# Patient Record
Sex: MALE | Race: WHITE | URBAN - METROPOLITAN AREA
[De-identification: names, ages, dates, MRNs, and addresses within clinical notes are randomized per-mention and may not be internally consistent; named-entity substitution may affect disease eponyms.]

---

## 2020-09-03 ENCOUNTER — OFFICE VISIT (OUTPATIENT)
Dept: FAMILY MEDICINE CLINIC | Facility: CLINIC | Age: 45
End: 2020-09-03
Payer: COMMERCIAL

## 2020-09-03 ENCOUNTER — TELEPHONE (OUTPATIENT)
Dept: FAMILY MEDICINE CLINIC | Facility: CLINIC | Age: 45
End: 2020-09-03

## 2020-09-03 VITALS
DIASTOLIC BLOOD PRESSURE: 102 MMHG | RESPIRATION RATE: 18 BRPM | SYSTOLIC BLOOD PRESSURE: 152 MMHG | OXYGEN SATURATION: 99 % | WEIGHT: 183 LBS | HEIGHT: 68 IN | TEMPERATURE: 97 F | HEART RATE: 68 BPM | BODY MASS INDEX: 27.74 KG/M2

## 2020-09-03 DIAGNOSIS — M25.572 CHRONIC PAIN OF LEFT ANKLE: ICD-10-CM

## 2020-09-03 DIAGNOSIS — Z76.89 ENCOUNTER TO ESTABLISH CARE: ICD-10-CM

## 2020-09-03 DIAGNOSIS — R03.0 ELEVATED BLOOD PRESSURE READING: ICD-10-CM

## 2020-09-03 DIAGNOSIS — G89.29 CHRONIC PAIN OF LEFT ANKLE: ICD-10-CM

## 2020-09-03 DIAGNOSIS — Z00.00 ANNUAL PHYSICAL EXAM: Primary | ICD-10-CM

## 2020-09-03 LAB
ALBUMIN SERPL-MCNC: 3.9 G/DL (ref 3.4–5)
ALBUMIN/GLOB SERPL: 1 {RATIO} (ref 1–2)
ALP LIVER SERPL-CCNC: 67 U/L (ref 45–117)
ALT SERPL-CCNC: 62 U/L (ref 16–61)
ANION GAP SERPL CALC-SCNC: 6 MMOL/L (ref 0–18)
AST SERPL-CCNC: 22 U/L (ref 15–37)
BASOPHILS # BLD AUTO: 0.1 X10(3) UL (ref 0–0.2)
BASOPHILS NFR BLD AUTO: 1.4 %
BILIRUB SERPL-MCNC: 0.8 MG/DL (ref 0.1–2)
BUN BLD-MCNC: 13 MG/DL (ref 7–18)
BUN/CREAT SERPL: 14 (ref 10–20)
CALCIUM BLD-MCNC: 8.7 MG/DL (ref 8.5–10.1)
CHLORIDE SERPL-SCNC: 104 MMOL/L (ref 98–112)
CHOLEST SMN-MCNC: 264 MG/DL (ref ?–200)
CO2 SERPL-SCNC: 26 MMOL/L (ref 21–32)
CREAT BLD-MCNC: 0.93 MG/DL (ref 0.7–1.3)
DEPRECATED RDW RBC AUTO: 41.6 FL (ref 35.1–46.3)
EOSINOPHIL # BLD AUTO: 0.08 X10(3) UL (ref 0–0.7)
EOSINOPHIL NFR BLD AUTO: 1.1 %
ERYTHROCYTE [DISTWIDTH] IN BLOOD BY AUTOMATED COUNT: 12.7 % (ref 11–15)
EST. AVERAGE GLUCOSE BLD GHB EST-MCNC: 108 MG/DL (ref 68–126)
GLOBULIN PLAS-MCNC: 3.8 G/DL (ref 2.8–4.4)
GLUCOSE BLD-MCNC: 84 MG/DL (ref 70–99)
HBA1C MFR BLD HPLC: 5.4 % (ref ?–5.7)
HCT VFR BLD AUTO: 49.5 % (ref 39–53)
HDLC SERPL-MCNC: 57 MG/DL (ref 40–59)
HGB BLD-MCNC: 16.8 G/DL (ref 13–17.5)
IMM GRANULOCYTES # BLD AUTO: 0.03 X10(3) UL (ref 0–1)
IMM GRANULOCYTES NFR BLD: 0.4 %
LDLC SERPL CALC-MCNC: 196 MG/DL (ref ?–100)
LYMPHOCYTES # BLD AUTO: 1.91 X10(3) UL (ref 1–4)
LYMPHOCYTES NFR BLD AUTO: 26.1 %
M PROTEIN MFR SERPL ELPH: 7.7 G/DL (ref 6.4–8.2)
MCH RBC QN AUTO: 30.3 PG (ref 26–34)
MCHC RBC AUTO-ENTMCNC: 33.9 G/DL (ref 31–37)
MCV RBC AUTO: 89.4 FL (ref 80–100)
MONOCYTES # BLD AUTO: 0.69 X10(3) UL (ref 0.1–1)
MONOCYTES NFR BLD AUTO: 9.4 %
NEUTROPHILS # BLD AUTO: 4.51 X10 (3) UL (ref 1.5–7.7)
NEUTROPHILS # BLD AUTO: 4.51 X10(3) UL (ref 1.5–7.7)
NEUTROPHILS NFR BLD AUTO: 61.6 %
NONHDLC SERPL-MCNC: 207 MG/DL (ref ?–130)
OSMOLALITY SERPL CALC.SUM OF ELEC: 281 MOSM/KG (ref 275–295)
PATIENT FASTING Y/N/NP: YES
PATIENT FASTING Y/N/NP: YES
PLATELET # BLD AUTO: 277 10(3)UL (ref 150–450)
POTASSIUM SERPL-SCNC: 3.7 MMOL/L (ref 3.5–5.1)
RBC # BLD AUTO: 5.54 X10(6)UL (ref 4.3–5.7)
SODIUM SERPL-SCNC: 136 MMOL/L (ref 136–145)
TRIGL SERPL-MCNC: 54 MG/DL (ref 30–149)
TSI SER-ACNC: 0.96 MIU/ML (ref 0.36–3.74)
VLDLC SERPL CALC-MCNC: 11 MG/DL (ref 0–30)
WBC # BLD AUTO: 7.3 X10(3) UL (ref 4–11)

## 2020-09-03 PROCEDURE — 85025 COMPLETE CBC W/AUTO DIFF WBC: CPT | Performed by: FAMILY MEDICINE

## 2020-09-03 PROCEDURE — 3077F SYST BP >= 140 MM HG: CPT | Performed by: FAMILY MEDICINE

## 2020-09-03 PROCEDURE — 3080F DIAST BP >= 90 MM HG: CPT | Performed by: FAMILY MEDICINE

## 2020-09-03 PROCEDURE — 99202 OFFICE O/P NEW SF 15 MIN: CPT | Performed by: FAMILY MEDICINE

## 2020-09-03 PROCEDURE — 80053 COMPREHEN METABOLIC PANEL: CPT | Performed by: FAMILY MEDICINE

## 2020-09-03 PROCEDURE — 3008F BODY MASS INDEX DOCD: CPT | Performed by: FAMILY MEDICINE

## 2020-09-03 PROCEDURE — 84443 ASSAY THYROID STIM HORMONE: CPT | Performed by: FAMILY MEDICINE

## 2020-09-03 PROCEDURE — 80061 LIPID PANEL: CPT | Performed by: FAMILY MEDICINE

## 2020-09-03 PROCEDURE — 99386 PREV VISIT NEW AGE 40-64: CPT | Performed by: FAMILY MEDICINE

## 2020-09-03 PROCEDURE — 83036 HEMOGLOBIN GLYCOSYLATED A1C: CPT | Performed by: FAMILY MEDICINE

## 2020-09-03 NOTE — PROGRESS NOTES
Shayy Luna is a 54year old male who presents for a complete physical exam.   HPI:   No concerns today.   Patient reports injury to his ankle and is seeing orthopedic surgeon and had surgery done on left ankle in past. He still has pain and wants to see ot anxiety      EXAM:   Resp 18   Ht 68\"   Wt 183 lb (83 kg)   BMI 27.83 kg/m²   Body mass index is 27.83 kg/m².    GENERAL: well nourished,in no apparent distress  SKIN: no rashes  HEENT: ears and throat are clear  EYES:PERRLA, EOMI, normal optic disk,conjun

## 2020-09-03 NOTE — PATIENT INSTRUCTIONS
Exercise for a Healthier Heart     Exercise with a friend. When activity is fun, you're more likely to stick with it. You may wonder how you can improve the health of your heart. If you’re thinking about exercise, you’re on the right track.  You don’t n Choose one or more activities you enjoy. Walking is one of the easiest things you can do. You can also try swimming, riding a bike, dancing, or taking an exercise class.   Stop exercising and call your doctor if you:  · Have chest pain or feel dizzy or ligh · Pause before you add sugars to pancakes, cereal, coffee, or tea. This includes white and brown table sugar, syrup, honey, and molasses. Cut your usual amount by half. · Use non-sugar sweeteners.  Stevia, aspartame, and sucralose can satisfy a sweet tooth · Flour tortillas  · Packaged muffins, pancakes, and biscuits  · Instant hot cereals  · Cottage cheese    For lunch and dinner  · Fresh fish, chicken, turkey, or meat—baked, broiled, or roasted without salt  · Dry beans, cooked without salt  · Marissa, stir-f

## 2020-09-03 NOTE — TELEPHONE ENCOUNTER
----- Message from Melissa Salazar MD sent at 9/3/2020  4:50 PM CDT -----  Please call patient and inform his labs shows normal liver and kidney functions, normal cbc and thyroid. His cholesterol, ldl are elevated.  I would recommend he start low fat diet a

## 2020-09-16 ENCOUNTER — TELEPHONE (OUTPATIENT)
Dept: FAMILY MEDICINE CLINIC | Facility: CLINIC | Age: 45
End: 2020-09-16

## 2020-09-16 DIAGNOSIS — G89.29 CHRONIC PAIN OF LEFT ANKLE: Primary | ICD-10-CM

## 2020-09-16 DIAGNOSIS — M25.572 CHRONIC PAIN OF LEFT ANKLE: Primary | ICD-10-CM

## 2020-09-16 NOTE — TELEPHONE ENCOUNTER
PATIENT WAS SEEN ON SEPT AND WAS TOLD TO SEE AN ORTHO HE DOES NOT REMEMBER THE NAME HE DOES NOT WANT TO GO TO [place] [id number]

## 2020-09-16 NOTE — TELEPHONE ENCOUNTER
Referral placed for Dr Anil Dunham. States that he remembered the name of the original ortho doctor with [facility]. No further questions.

## 2020-09-16 NOTE — TELEPHONE ENCOUNTER
No name for ortho in his chart-Dr Valentino Chen goes to the [facility] office. Is this who you wanted him to see?

## 2020-09-29 ENCOUNTER — TELEPHONE (OUTPATIENT)
Dept: FAMILY MEDICINE CLINIC | Facility: CLINIC | Age: 45
End: 2020-09-29

## 2020-09-29 NOTE — TELEPHONE ENCOUNTER
Pt was in contact with a friend over the weekend that tested positive for covid, his friend has a fever, no appetite, tested positive on Monday morning. Pt states he does not have any sx, but wants to know what he should do.   Please call

## 2020-09-29 NOTE — TELEPHONE ENCOUNTER
He should quarantine for 10 days. Avoid ibuprofen. If he has symptoms call office and make appointment. If he wish to get tested then schedule him doxi.

## 2020-09-29 NOTE — TELEPHONE ENCOUNTER
Spoke to patient. Patient advised. Verbalized understanding. Scheduled video visit.    Future Appointments   Date Time Provider Arsh Mary   9/29/2020  1:20 PM Blayne Lombardo MD [facility] Bermudez verbally consents to a Virtual/Tel

## 2020-10-29 ENCOUNTER — TELEPHONE (OUTPATIENT)
Dept: FAMILY MEDICINE CLINIC | Facility: CLINIC | Age: 45
End: 2020-10-29

## 2020-10-29 NOTE — TELEPHONE ENCOUNTER
Pt is having trouble breathing due to the change in season, happens every year. He was wanting to see if he can get some kind of medication to help with this issue.    Please advise

## 2024-10-22 ENCOUNTER — PATIENT OUTREACH (OUTPATIENT)
Dept: FAMILY MEDICINE CLINIC | Facility: CLINIC | Age: 49
End: 2024-10-22

## 2025-01-20 ENCOUNTER — HOSPITAL ENCOUNTER (OUTPATIENT)
Age: 50
Discharge: HOME | End: 2025-01-20
Payer: MEDICAID

## 2025-01-20 ENCOUNTER — APPOINTMENT (OUTPATIENT)
Dept: GENERAL RADIOLOGY | Age: 50
End: 2025-01-20
Attending: NURSE PRACTITIONER
Payer: MEDICAID

## 2025-01-20 VITALS
HEIGHT: 68 IN | BODY MASS INDEX: 28.79 KG/M2 | DIASTOLIC BLOOD PRESSURE: 100 MMHG | TEMPERATURE: 99 F | OXYGEN SATURATION: 97 % | RESPIRATION RATE: 18 BRPM | WEIGHT: 190 LBS | SYSTOLIC BLOOD PRESSURE: 160 MMHG | HEART RATE: 95 BPM

## 2025-01-20 DIAGNOSIS — R05.2 SUBACUTE COUGH: Primary | ICD-10-CM

## 2025-01-20 DIAGNOSIS — R03.0 ELEVATED BLOOD PRESSURE READING: ICD-10-CM

## 2025-01-20 PROCEDURE — 99203 OFFICE O/P NEW LOW 30 MIN: CPT | Performed by: NURSE PRACTITIONER

## 2025-01-20 PROCEDURE — 71046 X-RAY EXAM CHEST 2 VIEWS: CPT | Performed by: NURSE PRACTITIONER

## 2025-01-20 RX ORDER — DEXAMETHASONE 4 MG/1
4 TABLET ORAL ONCE
Status: COMPLETED | OUTPATIENT
Start: 2025-01-20 | End: 2025-01-20

## 2025-01-20 RX ORDER — ALBUTEROL SULFATE 90 UG/1
2 INHALANT RESPIRATORY (INHALATION) EVERY 4 HOURS PRN
Qty: 1 EACH | Refills: 0 | Status: SHIPPED | OUTPATIENT
Start: 2025-01-20 | End: 2025-02-19

## 2025-01-20 NOTE — ED INITIAL ASSESSMENT (HPI)
Pt states he's had a cough on and off since thanksgiving, states he bought a 'breathing exercise machine' off [employer] and states worsening cough the last 3 days after using it. No fevers.

## 2025-01-20 NOTE — DISCHARGE INSTRUCTIONS
- Use inhaler (1-2 puffs every 4-6 hours) with spacer as needed for chest tightness, wheezing, and/or bronchospasm    Supportive care measures to try as applicable:  General:   - Wash hands often   - Drink plenty of fluids (water, Pedialyte, etc.)   - Get plenty of rest and sleep with head elevated to help with sinus drainage and throat irritation   - Do not share utensils or drinks   - Alternate Ibuprofen (adult: 600mg) and Tylenol (adult: 650-1000mg) as needed for pain / body aches / fever   - Take a multivitamin and extra Vitamin D (~2000IU) daily, year round   - Prophylactic Vitamin C can help reduce symptoms if you become infected, but is less effective when already ill   - You may benefit from Zinc (~20mg) every day, or every other other day, for a week while sick (Zinc has been shown to kill respiratory viruses)    Cough / Sinus:   - Using saline spray or a couple drops into nostrils a couple times a day can help with sinus inflammation (Use nasal spray with nose forward and applicator tip pointed towards outside of eye. Breath normally. You should not feel medication go down your throat.)   - Avoid having air blow on your face as this can worsen congestion / cough   - You may benefit from placing a garlic clove in each nostril for 10-15min which should irritate sinuses, causing you to get rid of stuck mucus. Blow your nose thoroughly afterwards   - You may benefit from spoonfuls of honey (and/or added to warm drinks) throughout the day for cough   - You may benefit from taking a decongestant (e.g. Sudafed - pseudoephedrine [behind the pharmacy counter]) (may temporarily elevate your heart rate and blood pressure)   - You may benefit from cough medication containing \"Dextromethorphan\" (e.g. Delsym)   - You may benefit from using a humidifier and/or steam showers   - You may benefit from Flonase nasal spray daily (Use with head tilted down and tip pointed towards outside of eye. Breath normally. You should  not feel medication go down your throat)   - You may benefit from taking a daily allergy medication (e.g. Zyrtec, Xyzal, etc.)   - You may benefit from boiling water with lemon and cayenne pepper, then breathing in the steam (you can cover your head with a towel to help funnel the steam)

## 2025-01-20 NOTE — ED PROVIDER NOTES
History     Chief Complaint   Patient presents with    Cough/URI       Subjective:   HPI    Mervin Bermudez, 49 year old male with notable medical history of n/a who presents with cough. Patient reports intermittent cough for the past couple months. Denies associated fever, chills, WARD, SOB, URI symptoms. Patient recently purchased a breath , but thinks it made his symptoms worse. Patient also currently working in a basement and cutting wood.    Patient reports Hx similar in the remote past and being treated for \"walking pneumonia\" with antibiotics, steroids, and an inhaler.      There is no problem list on file for this patient.     Objective:   No pertinent past medical history.            No pertinent past surgical history.              No pertinent social history.            Medications Ordered Prior to Encounter[1]      Constitutional and vital signs reviewed.      All other systems reviewed and negative except as noted above.    I have reviewed the family history, social history, allergies, and outpatient medications.     History reviewed from EMR: Encounters, problem list, allergies, medications      Physical Exam     ED Triage Vitals [01/20/25 1615]   BP (!) 160/100   Pulse 95   Resp 18   Temp 98.7 °F (37.1 °C)   Temp src Oral   SpO2 97 %   O2 Device None (Room air)       Current:BP (!) 160/100   Pulse 95   Temp 98.7 °F (37.1 °C) (Oral)   Resp 18   Ht 172.7 cm (5' 8\")   Wt 86.2 kg   SpO2 97%   BMI 28.89 kg/m²       Physical Exam  Vitals and nursing note reviewed.   Constitutional:       General: He is not in acute distress.     Appearance: Normal appearance. He is normal weight. He is not ill-appearing or toxic-appearing.   HENT:      Head: Normocephalic and atraumatic.      Right Ear: External ear normal.      Left Ear: External ear normal.      Nose: Nose normal. No congestion or rhinorrhea.      Mouth/Throat:      Mouth: Mucous membranes are moist.   Eyes:      Extraocular Movements:  Extraocular movements intact.      Conjunctiva/sclera: Conjunctivae normal.      Pupils: Pupils are equal, round, and reactive to light.   Cardiovascular:      Rate and Rhythm: Normal rate and regular rhythm.      Pulses: Normal pulses.      Heart sounds: Normal heart sounds.   Pulmonary:      Effort: Pulmonary effort is normal. No respiratory distress.      Breath sounds: Normal breath sounds and air entry. No decreased breath sounds, wheezing, rhonchi or rales.      Comments: LS clear throughout  Musculoskeletal:         General: No swelling, tenderness or signs of injury. Normal range of motion.      Cervical back: Normal range of motion.   Skin:     General: Skin is warm and dry.      Capillary Refill: Capillary refill takes less than 2 seconds.   Neurological:      General: No focal deficit present.      Mental Status: He is alert and oriented to person, place, and time. Mental status is at baseline.   Psychiatric:         Mood and Affect: Mood normal.         Behavior: Behavior normal.         Thought Content: Thought content normal.         Judgment: Judgment normal.            ED Course     Labs Reviewed - No data to display  XR CHEST PA + LAT CHEST (CPT=71046)   Final Result   PROCEDURE:  XR CHEST PA + LAT CHEST (CPT=71046)       INDICATIONS:  intermittent cough for a couple months       COMPARISON:  None.       TECHNIQUE:  PA and lateral chest radiographs were obtained.       PATIENT STATED HISTORY: (As transcribed by Technologist)  Patient    complains of intermittent, dry cough over the last 3 months.             FINDINGS:  Cardiac silhouette and pulmonary vasculature within normal    limits. No focal consolidation, pneumothorax or pleural effusion.                         =====   CONCLUSION:  No focal consolidation.           LOCATION:  [facility]           Dictated by (CST): Barbara Childress MD on 1/20/2025 at 4:42 PM        Finalized by (CST): Barbara Childress MD on 1/20/2025 at 4:43 PM             Vitals:     01/20/25 1615   BP: (!) 160/100   Pulse: 95   Resp: 18   Temp: 98.7 °F (37.1 °C)   TempSrc: Oral   SpO2: 97%   Weight: 86.2 kg   Height: 172.7 cm (5' 8\")            AILYN Bermudez, 49 year old male with medical history as noted above who presents with cough   - Patient in NAD, VSS   - post-viral cough vs allergies vs viral vs PNA vs structural deformity vs mass vs bronchospasm vs other   - Less likely PNA given HPI, duration of symptoms, and exam   - CXR, Decadron, Saline neb, monitor        ** See ED course below for additional information on care provided / interventions / notable events throughout patient's encounter.    ** See Home Care Instructions below for care measures to trial as applicable.    ED Course as of 01/20/25 1647  ------------------------------------------------------------  Time: 01/20 1640  Comment: Self read of imaging w/o obvious acute process. Awaiting official read.    ------------------------------------------------------------  Time: 01/20 1646  Comment: Radiology confirming no acute process  Reassurance provided  Advised to f/u with primary care provider        ** I have independently reviewed the radiology images, clinical lab results, and ECG tracings as described above (if applicable)    ** Concerning co-morbidities possibly affecting complaint / care: n/a    ** See disposition & plan section below for home care instructions - if applicable        Medical Decision Making  Amount and/or Complexity of Data Reviewed  Radiology: ordered and independent interpretation performed. Decision-making details documented in ED Course.    Risk  OTC drugs.  Prescription drug management.        Disposition and Plan     Disposition:  Discharge  1/20/2025  4:47 pm    Clinical Impression:  1. Subacute cough    2. Elevated blood pressure reading            Home care instructions:     - Use inhaler (1-2 puffs every 4-6 hours) with spacer as needed for chest tightness, wheezing, and/or  bronchospasm    Supportive care measures to try as applicable:  General:   - Wash hands often   - Drink plenty of fluids (water, Pedialyte, etc.)   - Get plenty of rest and sleep with head elevated to help with sinus drainage and throat irritation   - Do not share utensils or drinks   - Alternate Ibuprofen (adult: 600mg) and Tylenol (adult: 650-1000mg) as needed for pain / body aches / fever   - Take a multivitamin and extra Vitamin D (~2000IU) daily, year round   - Prophylactic Vitamin C can help reduce symptoms if you become infected, but is less effective when already ill   - You may benefit from Zinc (~20mg) every day, or every other other day, for a week while sick (Zinc has been shown to kill respiratory viruses)    Cough / Sinus:   - Using saline spray or a couple drops into nostrils a couple times a day can help with sinus inflammation (Use nasal spray with nose forward and applicator tip pointed towards outside of eye. Breath normally. You should not feel medication go down your throat.)   - Avoid having air blow on your face as this can worsen congestion / cough   - You may benefit from placing a garlic clove in each nostril for 10-15min which should irritate sinuses, causing you to get rid of stuck mucus. Blow your nose thoroughly afterwards   - You may benefit from spoonfuls of honey (and/or added to warm drinks) throughout the day for cough   - You may benefit from taking a decongestant (e.g. Sudafed - pseudoephedrine [behind the pharmacy counter]) (may temporarily elevate your heart rate and blood pressure)   - You may benefit from cough medication containing \"Dextromethorphan\" (e.g. Delsym)   - You may benefit from using a humidifier and/or steam showers   - You may benefit from Flonase nasal spray daily (Use with head tilted down and tip pointed towards outside of eye. Breath normally. You should not feel medication go down your throat)   - You may benefit from taking a daily allergy medication (e.g.  Zyrtec, Xyzal, etc.)   - You may benefit from boiling water with lemon and cayenne pepper, then breathing in the steam (you can cover your head with a towel to help funnel the steam)      Follow-up:  [facility], [address], [place]  [address]  [facility] [address] [phone]    Primary Care contact to establish care (if needed)          Medications Prescribed:  Current Discharge Medication List        START taking these medications    Details   albuterol 108 (90 Base) MCG/ACT Inhalation Aero Soln Inhale 2 puffs into the lungs every 4 (four) hours as needed for Wheezing.  Qty: 1 each, Refills: 0               Ever Tenorio, JARED, APRN, AGACNP-BC, FNP-C, CNL  Adult-Gerontology Acute Care & Family Nurse Practitioner  [facility]      The above patient (and/or guardian) was made aware that an appropriate evaluation has been performed, and that no additional testing is required at this time. In my medical judgment, there is currently no evidence of an immediate life-threatening or surgical condition, therefore discharge is indicated at this time. The patient (and/or guardian) was advised that a small risk still exists that a serious condition could develop. The patient was instructed to arrange close follow-up with their primary care provider (or the referral provider given today). The patient received written and verbal instructions regarding their condition / concerns, demonstrated understanding, and is agreement with the outpatient treatment plan.            [1]   No current facility-administered medications on file prior to encounter.     No current outpatient medications on file prior to encounter.